# Patient Record
Sex: MALE | Race: BLACK OR AFRICAN AMERICAN
[De-identification: names, ages, dates, MRNs, and addresses within clinical notes are randomized per-mention and may not be internally consistent; named-entity substitution may affect disease eponyms.]

---

## 2017-10-03 ENCOUNTER — HOSPITAL ENCOUNTER (OUTPATIENT)
Dept: HOSPITAL 72 - RAD | Age: 57
Discharge: HOME | End: 2017-10-03
Payer: MEDICARE

## 2017-10-03 DIAGNOSIS — M16.0: Primary | ICD-10-CM

## 2017-10-03 DIAGNOSIS — M85.88: ICD-10-CM

## 2017-10-03 DIAGNOSIS — Z96.641: ICD-10-CM

## 2017-10-03 PROCEDURE — 73502 X-RAY EXAM HIP UNI 2-3 VIEWS: CPT

## 2017-10-04 NOTE — DIAGNOSTIC IMAGING REPORT
Indications: hip pain



Findings: Two  views of the right hip were obtained.



There is a revised right total hip replacement demonstrated. There is no 

fracture

identified. Bones are osteopenic. There is no dislocation identified.



Impression:



Revised right total hip replacement. No acute fracture appreciated

## 2017-10-04 NOTE — DIAGNOSTIC IMAGING REPORT
Indications: hip pain



Findings: Two  views of the left hip were obtained.



No acute fracture or malalignment definitely seen. Lower lumbar laminectomy and

fusion apparatus noted. Bones are osteopenic.



Impression:



No acute injury identified

## 2018-04-03 ENCOUNTER — HOSPITAL ENCOUNTER (INPATIENT)
Dept: HOSPITAL 72 - EMR | Age: 58
LOS: 6 days | Discharge: HOME | DRG: 552 | End: 2018-04-09
Payer: MEDICARE

## 2018-04-03 VITALS — SYSTOLIC BLOOD PRESSURE: 99 MMHG | DIASTOLIC BLOOD PRESSURE: 57 MMHG

## 2018-04-03 VITALS — HEIGHT: 74 IN | WEIGHT: 180 LBS | BODY MASS INDEX: 23.1 KG/M2

## 2018-04-03 VITALS — DIASTOLIC BLOOD PRESSURE: 64 MMHG | SYSTOLIC BLOOD PRESSURE: 113 MMHG

## 2018-04-03 VITALS — DIASTOLIC BLOOD PRESSURE: 78 MMHG | SYSTOLIC BLOOD PRESSURE: 150 MMHG

## 2018-04-03 VITALS — SYSTOLIC BLOOD PRESSURE: 126 MMHG | DIASTOLIC BLOOD PRESSURE: 72 MMHG

## 2018-04-03 DIAGNOSIS — Z86.14: ICD-10-CM

## 2018-04-03 DIAGNOSIS — M48.061: ICD-10-CM

## 2018-04-03 DIAGNOSIS — I10: ICD-10-CM

## 2018-04-03 DIAGNOSIS — M46.46: ICD-10-CM

## 2018-04-03 DIAGNOSIS — N40.0: ICD-10-CM

## 2018-04-03 DIAGNOSIS — R33.9: ICD-10-CM

## 2018-04-03 DIAGNOSIS — Z98.1: ICD-10-CM

## 2018-04-03 DIAGNOSIS — E87.5: ICD-10-CM

## 2018-04-03 DIAGNOSIS — B19.20: ICD-10-CM

## 2018-04-03 DIAGNOSIS — M25.50: ICD-10-CM

## 2018-04-03 DIAGNOSIS — M50.10: Primary | ICD-10-CM

## 2018-04-03 LAB
ADD MANUAL DIFF: YES
ALBUMIN SERPL-MCNC: 3.5 G/DL (ref 3.4–5)
ALBUMIN/GLOB SERPL: 0.9 {RATIO} (ref 1–2.7)
ALP SERPL-CCNC: 127 U/L (ref 46–116)
ALT SERPL-CCNC: 21 U/L (ref 12–78)
ANION GAP SERPL CALC-SCNC: 8 MMOL/L (ref 5–15)
AST SERPL-CCNC: 55 U/L (ref 15–37)
BILIRUB SERPL-MCNC: 0.4 MG/DL (ref 0.2–1)
BUN SERPL-MCNC: 14 MG/DL (ref 7–18)
CALCIUM SERPL-MCNC: 8.5 MG/DL (ref 8.5–10.1)
CHLORIDE SERPL-SCNC: 105 MMOL/L (ref 98–107)
CO2 SERPL-SCNC: 26 MMOL/L (ref 21–32)
CREAT SERPL-MCNC: 0.5 MG/DL (ref 0.55–1.3)
ERYTHROCYTE [DISTWIDTH] IN BLOOD BY AUTOMATED COUNT: 16 % (ref 11.6–14.8)
GLOBULIN SER-MCNC: 3.9 G/DL
HCT VFR BLD CALC: 38.8 % (ref 42–52)
HGB BLD-MCNC: 12.5 G/DL (ref 14.2–18)
MCV RBC AUTO: 94 FL (ref 80–99)
PLATELET # BLD: 221 K/UL (ref 150–450)
POTASSIUM SERPL-SCNC: 6.3 MMOL/L (ref 3.5–5.1)
RBC # BLD AUTO: 4.12 M/UL (ref 4.7–6.1)
SODIUM SERPL-SCNC: 138 MMOL/L (ref 136–145)
WBC # BLD AUTO: 3.3 K/UL (ref 4.8–10.8)

## 2018-04-03 PROCEDURE — 72125 CT NECK SPINE W/O DYE: CPT

## 2018-04-03 PROCEDURE — 36415 COLL VENOUS BLD VENIPUNCTURE: CPT

## 2018-04-03 PROCEDURE — 80053 COMPREHEN METABOLIC PANEL: CPT

## 2018-04-03 PROCEDURE — 81001 URINALYSIS AUTO W/SCOPE: CPT

## 2018-04-03 PROCEDURE — 84153 ASSAY OF PSA TOTAL: CPT

## 2018-04-03 PROCEDURE — 84132 ASSAY OF SERUM POTASSIUM: CPT

## 2018-04-03 PROCEDURE — 85007 BL SMEAR W/DIFF WBC COUNT: CPT

## 2018-04-03 PROCEDURE — 72131 CT LUMBAR SPINE W/O DYE: CPT

## 2018-04-03 PROCEDURE — 82306 VITAMIN D 25 HYDROXY: CPT

## 2018-04-03 PROCEDURE — 99285 EMERGENCY DEPT VISIT HI MDM: CPT

## 2018-04-03 PROCEDURE — 85025 COMPLETE CBC W/AUTO DIFF WBC: CPT

## 2018-04-03 RX ADMIN — PREGABALIN SCH MG: 50 CAPSULE ORAL at 17:31

## 2018-04-03 RX ADMIN — HEPARIN SODIUM SCH UNITS: 5000 INJECTION INTRAVENOUS; SUBCUTANEOUS at 20:49

## 2018-04-03 RX ADMIN — TAMSULOSIN HYDROCHLORIDE SCH MG: 0.4 CAPSULE ORAL at 20:48

## 2018-04-03 NOTE — DIAGNOSTIC IMAGING REPORT
Indication: Neck pain radiating down arm and back pain

 

Technique: Spiral acquisitions obtained through the cervical spine. No IV contrast

utilized. Multiplanar reconstructions were generated.  Total dose length product

564.28 mGycm. CTDIvol(s) 27.41 mGy. Dose reduction achieved using automated exposure

control.

 

 

Comparison: none

 

Findings: There is minimal posterior offset of C3 on C4, C4-C5, and of C5 on C6.

Otherwise normal bony alignment. The vertebral body heights are preserved. There is

upper thoracic spine fusion hardware incompletely included.

 

At C2-3, there is mild degenerative disc narrowing. There is broad-based posterior

disc protrusion and ligamentum flavum hypertrophy which results in mild narrowing of

the spinal canal to 9 mm minimum AP diameter. The neural foramina are preserved.

 

At C3-4, there is severe degenerative disc narrowing a large posterior osteophyte

impinges on the left lateral recess. There is severe left and moderate to severe

right neural foraminal stenosis.

 

At C4-5, there is there is moderate to severe degenerative disc narrowing. Posterior

osteophytes result in mild narrowing of the spinal canal. There is severe narrowing

of the bilateral neural foramina. Subchondral cyst with vacuum are seen in the

adjacent vertebral bodies

 

At C5-6, posterior osteophytes result in mild-to-moderate narrowing of the spinal

canal. There is severe bilateral neural foraminal narrowing. There is moderate to

severe degenerative disc narrowing. Subchondral cysts with vacuum are seen in the C5

vertebral body

 

At C6-7, there is moderate degenerative disc narrowing. The neural foramina are

minimally narrowed.

 

At C7-T1, there is moderate to severe degenerative disc narrowing. There is mild

narrowing the right neural foramen.

 

The included extra spinal soft tissues are unremarkable

 

Impression: No acute bony trauma

 

Degenerative changes, as detailed level of the orbits as above

 

 

 

The CT scanner at [facility] is accredited by the American College of

Radiology and the scans are performed using protocols designed to limit radiation

exposure to as low as reasonably achievable to attain images of sufficient resolution

adequate for diagnostic evaluation.

## 2018-04-03 NOTE — HISTORY & PHYSICAL
History and Physical


History & Physicial





thi sis an unfortunate male with historyof degernmative disc disease


 initrally had spine surgery in [place] 





has had hip painand back pain


noted to have diskitis o fthe lyumbar spine


 had spien surgery for that had fusion and had recurent spine MRSA infection


He then had septic hip and had few hip surgery and revision and required 

prolonged antibiotic therapy


he has been seen and eval by Dr salgado ID


he has been having a l;ot of pain


 recetnly his painDr discharged him fromhis clinic





PMH


leukopenia


 thrombocytopenioa


hepatiti sc


failed back syndrome


PSH:


numerous spine and hip surgery


historyof tracheostomy





MEDICATION


lyrica


xanax


 flomax doxycyline


was on alot of painmeds before


vitals reviewed


 no jvd


 cta


s1,as12,rrr


soft 


no clubbing





lumbar and thoracic failed spine disease


chronic pain


 ? tolerance to medicaiotn nad dependency





plan get ct lumbar and thoracic and get  eval transfer to rehab 

soon











HUONG MERCER Apr 3, 2018 21:56

## 2018-04-03 NOTE — EMERGENCY ROOM REPORT
History of Present Illness


General


Chief Complaint:  Back Pain-No Injury


Source:  Patient





Present Illness


HPI


57-year-old male presents ED for evaluation.  Patient states he is referred by 

his PMD.  Complaining of severe neck pain and back pain.  Denies any recent 

trauma.  States pain is tingling down his right arm.  States he had surgery on 

his back a few months ago.  States the pain medications are not helping.  Pain 

is 10 out of 10, sharp, radiating down both legs and down the right arm.  

Denies chest pain or shortness of breath.  No other aggravating relieving 

factors.  Denies any other associated symptoms


Allergies:  


Coded Allergies:  


     No Known Allergies (Verified  Allergy, Unknown, 11/29/06)





Patient History


Past Medical History:  HTN


Past Surgical History:  other - back surgery


Pertinent Family History:  none


Social History:  Denies: smoking, alcohol use, drug use


Immunizations:  UTD


Reviewed Nursing Documentation:  PMH: Agreed; PSxH: Agreed





Nursing Documentation-PMH


Hx Hypertension:  Yes





Review of Systems


All Other Systems:  negative except mentioned in HPI





Physical Exam





Vital Signs








  Date Time  Temp Pulse Resp B/P (MAP) Pulse Ox O2 Delivery O2 Flow Rate FiO2


 


4/3/18 11:18 98.1 80 16 126/72 98   





 98.1       








Sp02 EP Interpretation:  reviewed, normal


General Appearance:  no apparent distress, alert, GCS 15, non-toxic


Head:  normocephalic, atraumatic


Eyes:  bilateral eye normal inspection, bilateral eye PERRL


ENT:  hearing grossly normal, normal pharynx, no angioedema, normal voice


Neck:  full range of motion, supple/symm/no masses, tender midline


Respiratory:  chest non-tender, lungs clear, normal breath sounds, speaking 

full sentences


Cardiovascular #1:  regular rate, rhythm, no edema


Cardiovascular #2:  2+ carotid (R), 2+ carotid (L), 2+ radial (R), 2+ radial (L)

, 2+ dorsalis pedis (R), 2+ dorsalis pedis (L)


Gastrointestinal:  normal bowel sounds, non tender, soft, non-distended, no 

guarding, no rebound


Rectal:  deferred


Genitourinary:  normal inspection, no CVA tenderness, vertebral tenderness


Musculoskeletal:  back normal, gait/station normal, normal range of motion, non-

tender


Neurologic:  alert, oriented x3, responsive, motor strength/tone normal, 

sensory intact, speech normal


Psychiatric:  judgement/insight normal, memory normal, mood/affect normal, no 

suicidal/homicidal ideation


Reflexes:  3+ bicep (R), 3+ bicep (L), 3+ tricep (R), 3+ tricep (L), 3+ knee (R)

, 3+ knee (L)


Skin:  normal color, no rash, warm/dry, well hydrated


Lymphatic:  no adenopathy





Medical Decision Making


Diagnostic Impression:  


 Primary Impression:  


 Intractable back pain


 Additional Impression:  


 Cervical radiculopathy


ER Course


Hospital Course 


56 yo M presents to ED c/o pain in neck and back.  radiating down R arm





Differential diagnoses include: fx, dislocation, DJD





Clinical course


Patient placed on stretcher.  Cardiac monitor.  After initial history and 

physical I ordered labs, pain meds, CT Cspine





Labs - no leukocytosis, Hb/Hct stable.  K > 6 but hemolyzed


repeat labs pending


CT C spine extensive DJD





Patient having cervical radiculopathy explaining that radiating pain down the 

right arm.





Spoke to PMD.  Patient has had difficulty with pain control despite adequate 

analgesia patient continues to have pain.  Will require admission





Case discussed with  and he agreed to accept the patient to his service 

for further care and support 





I feel this is a highly complex case requiring extensive working including EKG/

Rhythm strip, Xray/CT/US, Blood/urine lab work, repeat exams while in ED, and 

administration of strong opiates/narcotics for pain control, admission to 

hospital or close patient follow up.  





Diagnosis -intractable back pain, cervical radiculopathy 





Patient admitted to floor in serious condition





Labs








Test


  4/3/18


12:55


 


White Blood Count


  3.3 K/UL


(4.8-10.8)


 


Red Blood Count


  4.12 M/UL


(4.70-6.10)


 


Hemoglobin


  12.5 G/DL


(14.2-18.0)


 


Hematocrit


  38.8 %


(42.0-52.0)


 


Mean Corpuscular Volume 94 FL (80-99) 


 


Mean Corpuscular Hemoglobin


  30.4 PG


(27.0-31.0)


 


Mean Corpuscular Hemoglobin


Concent 32.3 G/DL


(32.0-36.0)


 


Red Cell Distribution Width


  16.0 %


(11.6-14.8)


 


Platelet Count


  221 K/UL


(150-450)


 


Mean Platelet Volume


  7.3 FL


(6.5-10.1)


 


Neutrophils (%) (Auto)  % (45.0-75.0) 


 


Lymphocytes (%) (Auto)  % (20.0-45.0) 


 


Monocytes (%) (Auto)  % (1.0-10.0) 


 


Eosinophils (%) (Auto)  % (0.0-3.0) 


 


Basophils (%) (Auto)  % (0.0-2.0) 


 


Differential Total Cells


Counted 100 


 


 


Neutrophils % (Manual) 55 % (45-75) 


 


Lymphocytes % (Manual) 32 % (20-45) 


 


Monocytes % (Manual) 9 % (1-10) 


 


Eosinophils % (Manual) 4 % (0-3) 


 


Basophils % (Manual) 0 % (0-2) 


 


Band Neutrophils 0 % (0-8) 


 


Platelet Estimate Adequate 


 


Platelet Morphology Normal 


 


Hypochromasia 1+ 


 


Anisocytosis 1+ 


 


Sodium Level


  138 MMOL/L


(136-145)


 


Potassium Level


  6.3 MMOL/L


(3.5-5.1)


 


Chloride Level


  105 MMOL/L


()


 


Carbon Dioxide Level


  26 MMOL/L


(21-32)


 


Anion Gap


  8 mmol/L


(5-15)


 


Blood Urea Nitrogen


  14 mg/dL


(7-18)


 


Creatinine


  0.5 MG/DL


(0.55-1.30)


 


Estimat Glomerular Filtration


Rate > 60 mL/min


(>60)


 


Glucose Level


  100 MG/DL


()


 


Calcium Level


  8.5 MG/DL


(8.5-10.1)


 


Total Bilirubin


  0.4 MG/DL


(0.2-1.0)


 


Aspartate Amino Transf


(AST/SGOT) 55 U/L (15-37) 


 


 


Alanine Aminotransferase


(ALT/SGPT) 21 U/L (12-78) 


 


 


Alkaline Phosphatase


  127 U/L


()


 


Total Protein


  7.4 G/DL


(6.4-8.2)


 


Albumin


  3.5 G/DL


(3.4-5.0)


 


Globulin 3.9 g/dL 


 


Albumin/Globulin Ratio 0.9 (1.0-2.7) 








CT/MRI/US Diagnostic Results


CT/MRI/US Diagnostic Results :  


   Imaging Test Ordered:  CT C spine


   Impression


multilevel DJD





Last Vital Signs








  Date Time  Temp Pulse Resp B/P (MAP) Pulse Ox O2 Delivery O2 Flow Rate FiO2


 


4/3/18 11:35 98.1 80 16 126/72 98   





 98.1       








Status:  improved


Disposition:  ADMITTED AS INPATIENT


Condition:  Serious


Referrals:  


NON PHYSICIAN (PCP)











Arnold Sorenson MD Apr 3, 2018 14:22

## 2018-04-04 VITALS — SYSTOLIC BLOOD PRESSURE: 116 MMHG | DIASTOLIC BLOOD PRESSURE: 66 MMHG

## 2018-04-04 VITALS — DIASTOLIC BLOOD PRESSURE: 75 MMHG | SYSTOLIC BLOOD PRESSURE: 135 MMHG

## 2018-04-04 VITALS — DIASTOLIC BLOOD PRESSURE: 49 MMHG | SYSTOLIC BLOOD PRESSURE: 110 MMHG

## 2018-04-04 VITALS — DIASTOLIC BLOOD PRESSURE: 54 MMHG | SYSTOLIC BLOOD PRESSURE: 114 MMHG

## 2018-04-04 VITALS — DIASTOLIC BLOOD PRESSURE: 62 MMHG | SYSTOLIC BLOOD PRESSURE: 142 MMHG

## 2018-04-04 VITALS — SYSTOLIC BLOOD PRESSURE: 129 MMHG | DIASTOLIC BLOOD PRESSURE: 68 MMHG

## 2018-04-04 LAB
ADD MANUAL DIFF: NO
ALBUMIN SERPL-MCNC: 3.1 G/DL (ref 3.4–5)
ALBUMIN/GLOB SERPL: 1 {RATIO} (ref 1–2.7)
ALP SERPL-CCNC: 120 U/L (ref 46–116)
ALT SERPL-CCNC: 17 U/L (ref 12–78)
ANION GAP SERPL CALC-SCNC: 6 MMOL/L (ref 5–15)
AST SERPL-CCNC: 31 U/L (ref 15–37)
BASOPHILS NFR BLD AUTO: 1.3 % (ref 0–2)
BILIRUB SERPL-MCNC: 0.4 MG/DL (ref 0.2–1)
BUN SERPL-MCNC: 21 MG/DL (ref 7–18)
CALCIUM SERPL-MCNC: 8.3 MG/DL (ref 8.5–10.1)
CHLORIDE SERPL-SCNC: 103 MMOL/L (ref 98–107)
CO2 SERPL-SCNC: 28 MMOL/L (ref 21–32)
CREAT SERPL-MCNC: 0.7 MG/DL (ref 0.55–1.3)
EOSINOPHIL NFR BLD AUTO: 7.1 % (ref 0–3)
ERYTHROCYTE [DISTWIDTH] IN BLOOD BY AUTOMATED COUNT: 15.6 % (ref 11.6–14.8)
GLOBULIN SER-MCNC: 3.2 G/DL
HCT VFR BLD CALC: 36.8 % (ref 42–52)
HGB BLD-MCNC: 11.6 G/DL (ref 14.2–18)
LYMPHOCYTES NFR BLD AUTO: 42.2 % (ref 20–45)
MCV RBC AUTO: 95 FL (ref 80–99)
MONOCYTES NFR BLD AUTO: 11.3 % (ref 1–10)
NEUTROPHILS NFR BLD AUTO: 38 % (ref 45–75)
PLATELET # BLD: 187 K/UL (ref 150–450)
POTASSIUM SERPL-SCNC: 3.7 MMOL/L (ref 3.5–5.1)
RBC # BLD AUTO: 3.89 M/UL (ref 4.7–6.1)
SODIUM SERPL-SCNC: 137 MMOL/L (ref 136–145)
WBC # BLD AUTO: 3.6 K/UL (ref 4.8–10.8)

## 2018-04-04 RX ADMIN — HEPARIN SODIUM SCH UNITS: 5000 INJECTION INTRAVENOUS; SUBCUTANEOUS at 08:20

## 2018-04-04 RX ADMIN — PREGABALIN SCH MG: 50 CAPSULE ORAL at 13:20

## 2018-04-04 RX ADMIN — ZOLPIDEM TARTRATE PRN MG: 5 TABLET ORAL at 23:17

## 2018-04-04 RX ADMIN — PREGABALIN SCH MG: 50 CAPSULE ORAL at 08:16

## 2018-04-04 RX ADMIN — ALPRAZOLAM SCH MG: 0.5 TABLET ORAL at 08:16

## 2018-04-04 RX ADMIN — TAMSULOSIN HYDROCHLORIDE SCH MG: 0.4 CAPSULE ORAL at 20:27

## 2018-04-04 RX ADMIN — ALPRAZOLAM SCH MG: 0.5 TABLET ORAL at 17:36

## 2018-04-04 RX ADMIN — PREGABALIN SCH MG: 50 CAPSULE ORAL at 17:36

## 2018-04-04 RX ADMIN — HEPARIN SODIUM SCH UNITS: 5000 INJECTION INTRAVENOUS; SUBCUTANEOUS at 20:29

## 2018-04-04 NOTE — DIAGNOSTIC IMAGING REPORT
Indication: Back pain

 

Technique: Continuous helical transaxial imaging of the lumbar spine was obtained

from the lung bases to the pubic symphysis.  No IV contrast was administered. 

Coronal 2-D reformats were also obtained. Study obtained in a Siemens sensation 64

slice CT. 

 

Total Dose length Product (DLP):  629.25 mGycm

 

CT Dose Index Volume (CTDIvol):   17.51 mGy

 

 

Comparison: None

 

Findings: The field of view encompasses T11 through majority of the sacrum.

Multilevel posterior fusion has been performed with pedicle screws and fusion rods

from T11 through S1. Above T11 there may be continuation of hardware but this is

beyond the field of view. The fusion apparatus is overlapping. At the lower thoracic

spine, there are bilateral pedicle screws and fusion rods at both T11 and T12. The

rods are interconnected with a set of posterior lumbar fusion rods. The lumbar fusion

rods are associated with bilateral pedicle screws at L1, left unilateral pedicle

screw at L2, bilateral pedicle screws at L3, L4, L5 and S1. On the left side there is

a screw that traverses the left ilium adjacent to and partly traversing the

sacroiliac joint. There is diffuse osseous union of the facets throughout the

visualized portions of the lower thoracic and lumbar spine. Multilevel interbody

fusion also noted throughout the lumbar spine. Buttressing anterior vertebral screw

also noted at the anterior inferior aspect of the L4 vertebra. Prosthetic disks at

L1-2, L2-3, L3-4, L4-5 demonstrated. Partial fusion of the right sacroiliac joint

noted. There is streak artifact from metallic hardware limiting evaluation. But there

is no obvious fracture. Laminectomies also noted at some of the levels including left

side at L1-2, bilateral at L4-5 and L5-S1. Moderate to PATIENT is noted within the

aorta. Moderate amount of fecal material distending the rectosigmoid colon. Right

total hip prosthesis noted. Evaluation of the central spinal canal is difficult

because of the artifact present. At L4-5 there is a large osseous fragment that

projects into the canal. For further evaluation of this MR may be of benefit.

 

IMPRESSION:

 

Multilevel lower thoracic and lumbar fusion as described above. Diffuse anterior

interbody and posterior facet fusion noted. Multilevel laminectomy is also present.

 

Prominent bone spur projecting into the thecal sac at L4-5. Further evaluation of

this with MR may be of benefit as warranted clinically.

 

Atherosclerotic disease

 

Fecal impaction

 

 

 

The CT scanner at [facility] is accredited by the American College of

Radiology and the scans are performed using dose optimization techniques as

appropriate to a performed exam including Automatic Exposure control.

## 2018-04-04 NOTE — CONSULTATION
Consult Note


Assessment/Plan


Renal consult dictated # [id number]











JESSICA SMITH Apr 4, 2018 14:52

## 2018-04-04 NOTE — GENERAL PROGRESS NOTE
Assessment/Plan


Status Narrative


hyperkalemia


? lab error


failed back synderorme


 cervical adisc disease


 groin numbness likleywill be permenant


to rehab [facility] or [place] [facility].





Subjective


Date patient seen:  Apr 4, 2018


Time patient seen:  20:44


Constitutional:  Reports: no symptoms


HEENT:  Reports: no symptoms


Allergies:  


Coded Allergies:  


     No Known Allergies (Verified  Allergy, Unknown, 11/29/06)


Subjective


compliandso f pain


no fevernochills


 no ramya stpain 


ha sgroin numbnes sand tingling





Objective





Last 24 Hour Vital Signs








  Date Time  Temp Pulse Resp B/P (MAP) Pulse Ox O2 Delivery O2 Flow Rate FiO2


 


4/4/18 19:54      Room Air  


 


4/4/18 19:48 97.3 66 20 135/75 100 Room Air  





 97.3       


 


4/4/18 18:28 97.9       


 


4/4/18 17:58 97.9       


 


4/4/18 17:20      Room Air  


 


4/4/18 16:00 97.9 62 18 129/68 99   





 97.9       


 


4/4/18 13:37 97.5       


 


4/4/18 13:01      Room Air  


 


4/4/18 12:00 97.5 63 20 142/62 100   





 97.5       


 


4/4/18 09:27 97.3       


 


4/4/18 08:00 97.3 70 19 114/54 99 Room Air  





 97.3       


 


4/4/18 08:00 97.3 70 19 114/54 99   





 97.3       


 


4/4/18 05:06 97.8       


 


4/4/18 04:19      Room Air  


 


4/4/18 04:00 97.8 73 18 116/66 97   





 97.8       


 


4/4/18 01:02 97.6       


 


4/4/18 01:00  74  110/49    


 


4/4/18 00:12      Room Air  


 


4/3/18 23:57 97.6 70 18 99/57 97   





 97.6       


 


4/3/18 20:49 98.3       

















Intake and Output  


 


 4/3/18 4/4/18





 19:00 07:00


 


Intake Total 375 ml 480 ml


 


Balance 375 ml 480 ml


 


  


 


Intake Oral 375 ml 480 ml


 


# Voids 2 2


 


# Bowel Movements  1








Laboratory Tests


4/4/18 05:10: 


White Blood Count 3.6L, Red Blood Count 3.89L, Hemoglobin 11.6L, Hematocrit 

36.8L, Mean Corpuscular Volume 95, Mean Corpuscular Hemoglobin 29.8, Mean 

Corpuscular Hemoglobin Concent 31.4L, Red Cell Distribution Width 15.6H, 

Platelet Count 187, Mean Platelet Volume 7.2, Neutrophils (%) (Auto) 38.0L, 

Lymphocytes (%) (Auto) 42.2, Monocytes (%) (Auto) 11.3H, Eosinophils (%) (Auto) 

7.1H, Basophils (%) (Auto) 1.3, Sodium Level 137, Potassium Level 3.7, Chloride 

Level 103, Carbon Dioxide Level 28, Anion Gap 6, Blood Urea Nitrogen 21H, 

Creatinine 0.7, Estimat Glomerular Filtration Rate > 60, Glucose Level 100, 

Calcium Level 8.3L, Total Bilirubin 0.4, Aspartate Amino Transf (AST/SGOT) 31, 

Alanine Aminotransferase (ALT/SGPT) 17, Alkaline Phosphatase 120H, Total 

Protein 6.3L, Albumin 3.1L, Globulin 3.2, Albumin/Globulin Ratio 1.0


Height (Feet):  6


Height (Inches):  2.00


Weight (Pounds):  180


General Appearance:  WD/WN


Cardiovascular:  normal rate, regular rhythm, no JVD


Respiratory/Chest:  chest wall non-tender


Abdomen:  soft











HUONG MERCER Apr 4, 2018 20:46

## 2018-04-04 NOTE — CONSULTATION
DATE OF CONSULTATION:  04/04/2018



NEPHROLOGY CONSULTATION



CONSULTING PHYSICIAN:  Carlos Zaidi M.D.



REFERRING PHYSICIAN:  Delano Hadley M.D.



REASON FOR CONSULTATION:  Hyperkalemia, BPH, and mild hypertension.



HISTORY OF PRESENT ILLNESS:  The patient is a 57-year-old 

male who has had back pain.  The patient has had problems with diskitis

surgery with fusion and recurrent spine MRSA infections.  The patient was

admitted and was found to have a potassium 6.3 yesterday however the

potassium is down to 3.7 today, this in the presence of a normal kidney

function.  The patient has also some elevation of blood pressure 142/62

and also he has history of enlarged prostate and I was asked to see him in

Nephrology consultation.



PAST MEDICAL HISTORY:  Also includes history of hepatitis C, failed back

syndrome, numerous supine and hip surgeries, history of tracheostomy,

leukopenia, and thrombocytopenia.



MEDICATIONS:  Reviewed in the EMR.



SOCIAL HISTORY:  The patient has been drinking beer recently because of his

pain.  He lives in the unit with his aunt.



ALLERGIES:  No known drug allergies.



REVIEW OF SYSTEMS:  Noncontributory.



PHYSICAL EXAMINATION:

GENERAL:  The patient is a 57-year-old male, in no acute distress.

VITAL SIGNS:  Blood pressure 142/62, pulse 63, temperature 97.5,

respiratory is 20.

HEENT:  Pink conjunctivae.  Anicteric sclerae.

NECK:  Supple.

LUNGS:  Clear to auscultation.

HEART:  S1 and S2 without murmurs or rubs.

ABDOMEN:  Soft, nontender.

EXTREMITIES:  No cyanosis or edema.



LABORATORY FINDINGS:  CBC shows WBC of 3600, hematocrit 36.8, hemoglobin is

11.6, platelets 187,000.  Chemistry panel shows a serum sodium of 137,

potassium 3.7, chloride 103, CO2 of 28, BUN is 21, and creatinine 0.7,

calcium is 8.3.



ASSESSMENT:  This is a 57-year-old male who was admitted with back pain and

shoulder pain.  I was asked to see him because of his hyperkalemia.  It

appears that his hyperkalemia had been a laboratory error since the repeat

is 3.7, also patient was not getting any potassium at home, also his

kidney function is normal.  The other issue he has is his BPH and he is

taking Flomax.  He states that he is going only once at night to urinate.

It appears that his symptoms are under control.  His elevation of blood

pressure may be related to his pain at this point.



PLAN:

1. PSA level will be checked to make sure the patient does not have any

sign of any malignancy with high PSA.

2. I would continue Flomax as is.

3. A vitamin D level will be checked as his serum calcium is somewhat on

the low side.

4. I would hold off any blood pressure medications and observe him for

now.



Thank you very much, Dr. Hadley, for this consultation.









  ______________________________________________

  Carlos Zaidi M.D.





DR:  Poncho

D:  04/04/2018 14:47

T:  04/04/2018 17:54

JOB#:  [id number]

CC:

## 2018-04-05 VITALS — DIASTOLIC BLOOD PRESSURE: 59 MMHG | SYSTOLIC BLOOD PRESSURE: 120 MMHG

## 2018-04-05 VITALS — SYSTOLIC BLOOD PRESSURE: 130 MMHG | DIASTOLIC BLOOD PRESSURE: 69 MMHG

## 2018-04-05 VITALS — SYSTOLIC BLOOD PRESSURE: 105 MMHG | DIASTOLIC BLOOD PRESSURE: 62 MMHG

## 2018-04-05 VITALS — SYSTOLIC BLOOD PRESSURE: 116 MMHG | DIASTOLIC BLOOD PRESSURE: 77 MMHG

## 2018-04-05 VITALS — SYSTOLIC BLOOD PRESSURE: 124 MMHG | DIASTOLIC BLOOD PRESSURE: 70 MMHG

## 2018-04-05 VITALS — SYSTOLIC BLOOD PRESSURE: 134 MMHG | DIASTOLIC BLOOD PRESSURE: 76 MMHG

## 2018-04-05 LAB
APPEARANCE UR: CLEAR
APTT PPP: (no result) S
GLUCOSE UR STRIP-MCNC: NEGATIVE MG/DL
KETONES UR QL STRIP: NEGATIVE
LEUKOCYTE ESTERASE UR QL STRIP: NEGATIVE
NITRITE UR QL STRIP: NEGATIVE
PH UR STRIP: 7 [PH] (ref 4.5–8)
PROT UR QL STRIP: NEGATIVE
SP GR UR STRIP: 1.01 (ref 1–1.03)
UROBILINOGEN UR-MCNC: NORMAL MG/DL (ref 0–1)

## 2018-04-05 RX ADMIN — ALPRAZOLAM SCH MG: 0.5 TABLET ORAL at 17:47

## 2018-04-05 RX ADMIN — PREGABALIN SCH MG: 50 CAPSULE ORAL at 17:47

## 2018-04-05 RX ADMIN — HEPARIN SODIUM SCH UNITS: 5000 INJECTION INTRAVENOUS; SUBCUTANEOUS at 20:12

## 2018-04-05 RX ADMIN — PREGABALIN SCH MG: 50 CAPSULE ORAL at 08:51

## 2018-04-05 RX ADMIN — ALPRAZOLAM SCH MG: 0.5 TABLET ORAL at 08:51

## 2018-04-05 RX ADMIN — TAMSULOSIN HYDROCHLORIDE SCH MG: 0.4 CAPSULE ORAL at 20:08

## 2018-04-05 RX ADMIN — PREGABALIN SCH MG: 50 CAPSULE ORAL at 13:55

## 2018-04-05 RX ADMIN — HEPARIN SODIUM SCH UNITS: 5000 INJECTION INTRAVENOUS; SUBCUTANEOUS at 08:55

## 2018-04-05 NOTE — NEPHROLOGY PROGRESS NOTE
Assessment/Plan


Problem List:  


(1) Cervical radiculopathy


(2) Intractable back pain


(3) Urinary retention


Assessment


PSA low at 0.18


Plan


cont flow max


pain meds


watch BP





Subjective


Subjective


C/O pains and aches





Objective


Objective





Last 24 Hour Vital Signs








  Date Time  Temp Pulse Resp B/P (MAP) Pulse Ox O2 Delivery O2 Flow Rate FiO2


 


4/5/18 08:00 97.6 54 19 134/76 100   





 97.6       


 


4/5/18 04:00 97.3 58 18 116/77 99   





 97.3       


 


4/5/18 02:51 97.5       


 


4/5/18 02:21 97.5       


 


4/5/18 00:28      Room Air  


 


4/5/18 00:00 97.5 66 18 105/62 96   





 97.5       


 


4/4/18 22:06 97.3       


 


4/4/18 19:54      Room Air  


 


4/4/18 19:48 97.3 66 20 135/75 100 Room Air  





 97.3       


 


4/4/18 17:58 97.9       


 


4/4/18 17:20      Room Air  


 


4/4/18 16:00 97.9 62 18 129/68 99   





 97.9       


 


4/4/18 13:37 97.5       


 


4/4/18 13:01      Room Air  

















Intake and Output  


 


 4/4/18 4/5/18





 19:00 07:00


 


Intake Total 2500 ml 900 ml


 


Output Total 3200 ml 2400 ml


 


Balance -700 ml -1500 ml


 


  


 


Intake Oral 2500 ml 900 ml


 


Output Urine Total 3200 ml 2400 ml








Laboratory Tests


4/4/18 23:32: 


Urine Color Pale yellow, Urine Appearance Clear, Urine pH 7, Urine Specific 

Gravity 1.010, Urine Protein Negative, Urine Glucose (UA) Negative, Urine 

Ketones Negative, Urine Occult Blood Negative, Urine Nitrite Negative, Urine 

Bilirubin Negative, Urine Urobilinogen Normal, Urine Leukocyte Esterase Negative

, Urine RBC 0, Urine WBC 0, Urine Squamous Epithelial Cells None, Urine 

Bacteria None


4/5/18 06:37: 


Prostate Specific Antigen 0.18, Vitamin D 25-Hydroxy [Pending], 25-Hydroxy 

Vitamin D2 [Pending], 25-Hydroxy Vitamin D3 [Pending]


Height (Feet):  6


Height (Inches):  2.00


Weight (Pounds):  180


Cardiovascular:  normal rate


Respiratory/Chest:  lungs clear


Extremities:  other - no edema











JESSICA SMITH Apr 5, 2018 12:11

## 2018-04-05 NOTE — GENERAL PROGRESS NOTE
Assessment/Plan


Status Narrative


failed back syndroem


 failed thoraci syndrome


 cervical radiculitis


 pineda arhtropahty 


history of tracheostomy


history of hep c


leukopenia history


plan 


trnsfer to [facility] e in am and have him have shoulder surgery then to go to rehab.





Subjective


Date patient seen:  Apr 5, 2018


Time patient seen:  19:49


Constitutional:  Reports: no symptoms


HEENT:  Reports: no symptoms


Cardiovascular:  Reports: no symptoms


Allergies:  


Coded Allergies:  


     No Known Allergies (Verified  Allergy, Unknown, 11/29/06)


Subjective


compliandso f pain


no fevernochills


 no ramya stpain 


ha sgroin numbnes sand tingling





Objective





Last 24 Hour Vital Signs








  Date Time  Temp Pulse Resp B/P (MAP) Pulse Ox O2 Delivery O2 Flow Rate FiO2


 


4/5/18 15:49 96.9 60 20 124/70 100   





 96.9       


 


4/5/18 12:00 96.5 66 20 120/59 100   





 96.5       


 


4/5/18 08:00 97.6 54 19 134/76 100   





 97.6       


 


4/5/18 04:00 97.3 58 18 116/77 99   





 97.3       


 


4/5/18 02:51 97.5       


 


4/5/18 02:21 97.5       


 


4/5/18 00:28      Room Air  


 


4/5/18 00:00 97.5 66 18 105/62 96   





 97.5       


 


4/4/18 22:06 97.3       


 


4/4/18 19:54      Room Air  

















Intake and Output  


 


 4/4/18 4/5/18





 19:00 07:00


 


Intake Total 2500 ml 900 ml


 


Output Total 3200 ml 2400 ml


 


Balance -700 ml -1500 ml


 


  


 


Intake Oral 2500 ml 900 ml


 


Output Urine Total 3200 ml 2400 ml








Laboratory Tests


4/4/18 23:32: 


Urine Color Pale yellow, Urine Appearance Clear, Urine pH 7, Urine Specific 

Gravity 1.010, Urine Protein Negative, Urine Glucose (UA) Negative, Urine 

Ketones Negative, Urine Occult Blood Negative, Urine Nitrite Negative, Urine 

Bilirubin Negative, Urine Urobilinogen Normal, Urine Leukocyte Esterase Negative

, Urine RBC 0, Urine WBC 0, Urine Squamous Epithelial Cells None, Urine 

Bacteria None


4/5/18 06:37: 


Prostate Specific Antigen 0.18, Vitamin D 25-Hydroxy [Pending], 25-Hydroxy 

Vitamin D2 [Pending], 25-Hydroxy Vitamin D3 [Pending]


Height (Feet):  6


Height (Inches):  2.00


Weight (Pounds):  180


General Appearance:  WD/WN


Cardiovascular:  no JVD


Respiratory/Chest:  lungs clear


Abdomen:  non tender, soft











HUONG MERCER Apr 5, 2018 19:50

## 2018-04-06 VITALS — DIASTOLIC BLOOD PRESSURE: 66 MMHG | SYSTOLIC BLOOD PRESSURE: 123 MMHG

## 2018-04-06 VITALS — DIASTOLIC BLOOD PRESSURE: 61 MMHG | SYSTOLIC BLOOD PRESSURE: 137 MMHG

## 2018-04-06 VITALS — DIASTOLIC BLOOD PRESSURE: 73 MMHG | SYSTOLIC BLOOD PRESSURE: 124 MMHG

## 2018-04-06 VITALS — SYSTOLIC BLOOD PRESSURE: 121 MMHG | DIASTOLIC BLOOD PRESSURE: 83 MMHG

## 2018-04-06 VITALS — DIASTOLIC BLOOD PRESSURE: 72 MMHG | SYSTOLIC BLOOD PRESSURE: 143 MMHG

## 2018-04-06 VITALS — DIASTOLIC BLOOD PRESSURE: 70 MMHG | SYSTOLIC BLOOD PRESSURE: 125 MMHG

## 2018-04-06 RX ADMIN — ZOLPIDEM TARTRATE PRN MG: 5 TABLET ORAL at 20:29

## 2018-04-06 RX ADMIN — HEPARIN SODIUM SCH UNITS: 5000 INJECTION INTRAVENOUS; SUBCUTANEOUS at 20:30

## 2018-04-06 RX ADMIN — PREGABALIN SCH MG: 50 CAPSULE ORAL at 13:04

## 2018-04-06 RX ADMIN — TAMSULOSIN HYDROCHLORIDE SCH MG: 0.4 CAPSULE ORAL at 20:29

## 2018-04-06 RX ADMIN — HEPARIN SODIUM SCH UNITS: 5000 INJECTION INTRAVENOUS; SUBCUTANEOUS at 09:57

## 2018-04-06 RX ADMIN — ALPRAZOLAM SCH MG: 0.5 TABLET ORAL at 09:52

## 2018-04-06 RX ADMIN — ALPRAZOLAM SCH MG: 0.5 TABLET ORAL at 18:00

## 2018-04-06 RX ADMIN — ZOLPIDEM TARTRATE PRN MG: 5 TABLET ORAL at 00:18

## 2018-04-06 RX ADMIN — PREGABALIN SCH MG: 50 CAPSULE ORAL at 09:52

## 2018-04-06 RX ADMIN — HYDROCODONE BITARTRATE AND ACETAMINOPHEN PRN TAB: 10; 325 TABLET ORAL at 22:40

## 2018-04-06 RX ADMIN — HYDROCODONE BITARTRATE AND ACETAMINOPHEN PRN TAB: 10; 325 TABLET ORAL at 18:19

## 2018-04-06 RX ADMIN — PREGABALIN SCH MG: 50 CAPSULE ORAL at 18:00

## 2018-04-06 NOTE — NEPHROLOGY PROGRESS NOTE
Assessment/Plan


Problem List:  


(1) Cervical radiculopathy


(2) Intractable back pain


(3) Urinary retention


Assessment


PSA low at 0.18


Plan


cont flow max


pain meds


watch BP


will discuss with Dr Hadley





Subjective


Subjective


wants surgery on his R shoulder





Objective


Objective





Last 24 Hour Vital Signs








  Date Time  Temp Pulse Resp B/P (MAP) Pulse Ox O2 Delivery O2 Flow Rate FiO2


 


4/6/18 11:46 96.8 70 20 125/70 99   





 96.8       


 


4/6/18 08:00 97.1 65 20 137/61 98   





 97.1       


 


4/6/18 04:00 97.3 64 18 124/73 100   





 97.3       


 


4/6/18 00:00 97.3 62 18 123/66 100   





 97.3       


 


4/5/18 20:00 97.0 64 20 130/69 100   





 97.0       


 


4/5/18 15:49 96.9 60 20 124/70 100   





 96.9       

















Intake and Output  


 


 4/5/18 4/6/18





 19:00 07:00


 


Intake Total 730 ml 800 ml


 


Output Total 700 ml 


 


Balance 30 ml 800 ml


 


  


 


Intake Oral 730 ml 800 ml


 


Output Urine Total 700 ml 


 


# Voids  1


 


# Bowel Movements 1 1








Height (Feet):  6


Height (Inches):  2.00


Weight (Pounds):  180


Cardiovascular:  normal rate


Respiratory/Chest:  lungs clear











JESSICA SMITH Apr 6, 2018 13:48

## 2018-04-07 VITALS — DIASTOLIC BLOOD PRESSURE: 76 MMHG | SYSTOLIC BLOOD PRESSURE: 113 MMHG

## 2018-04-07 VITALS — SYSTOLIC BLOOD PRESSURE: 117 MMHG | DIASTOLIC BLOOD PRESSURE: 64 MMHG

## 2018-04-07 VITALS — SYSTOLIC BLOOD PRESSURE: 138 MMHG | DIASTOLIC BLOOD PRESSURE: 69 MMHG

## 2018-04-07 VITALS — SYSTOLIC BLOOD PRESSURE: 145 MMHG | DIASTOLIC BLOOD PRESSURE: 80 MMHG

## 2018-04-07 VITALS — SYSTOLIC BLOOD PRESSURE: 145 MMHG | DIASTOLIC BLOOD PRESSURE: 74 MMHG

## 2018-04-07 VITALS — DIASTOLIC BLOOD PRESSURE: 78 MMHG | SYSTOLIC BLOOD PRESSURE: 154 MMHG

## 2018-04-07 RX ADMIN — PREGABALIN SCH MG: 50 CAPSULE ORAL at 08:45

## 2018-04-07 RX ADMIN — HYDROCODONE BITARTRATE AND ACETAMINOPHEN PRN TAB: 10; 325 TABLET ORAL at 19:52

## 2018-04-07 RX ADMIN — TAMSULOSIN HYDROCHLORIDE SCH MG: 0.4 CAPSULE ORAL at 19:51

## 2018-04-07 RX ADMIN — HEPARIN SODIUM SCH UNITS: 5000 INJECTION INTRAVENOUS; SUBCUTANEOUS at 19:53

## 2018-04-07 RX ADMIN — ALPRAZOLAM SCH MG: 0.5 TABLET ORAL at 17:34

## 2018-04-07 RX ADMIN — HYDROCODONE BITARTRATE AND ACETAMINOPHEN PRN TAB: 10; 325 TABLET ORAL at 06:40

## 2018-04-07 RX ADMIN — PREGABALIN SCH MG: 50 CAPSULE ORAL at 13:26

## 2018-04-07 RX ADMIN — HYDROCODONE BITARTRATE AND ACETAMINOPHEN PRN TAB: 10; 325 TABLET ORAL at 02:40

## 2018-04-07 RX ADMIN — HYDROCODONE BITARTRATE AND ACETAMINOPHEN PRN TAB: 10; 325 TABLET ORAL at 11:30

## 2018-04-07 RX ADMIN — ALPRAZOLAM SCH MG: 0.5 TABLET ORAL at 08:45

## 2018-04-07 RX ADMIN — HYDROCODONE BITARTRATE AND ACETAMINOPHEN PRN TAB: 10; 325 TABLET ORAL at 15:41

## 2018-04-07 RX ADMIN — PREGABALIN SCH MG: 50 CAPSULE ORAL at 17:34

## 2018-04-07 RX ADMIN — HEPARIN SODIUM SCH UNITS: 5000 INJECTION INTRAVENOUS; SUBCUTANEOUS at 08:45

## 2018-04-07 NOTE — NEPHROLOGY PROGRESS NOTE
Assessment/Plan


Problem List:  


(1) Cervical radiculopathy


(2) Intractable back pain


(3) Urinary retention


Assessment


PSA low at 0.18


Plan


cont flow max


pain meds


check Vit D


Discussed with Dr Hadley





Subjective


Subjective


In NAD





Objective


Objective





Last 24 Hour Vital Signs








  Date Time  Temp Pulse Resp B/P (MAP) Pulse Ox O2 Delivery O2 Flow Rate FiO2


 


4/7/18 12:01 97.9 69 20 154/78 100   





 97.9       


 


4/7/18 08:09 97.3 65 20 117/64 99   





 97.3       


 


4/7/18 05:31 97.0 68 16 113/76 98   





 97.0       


 


4/7/18 04:00 96.9 68 16 113/76 98   





 96.9       


 


4/7/18 00:00 97.9 81 17 145/80 93   





 97.9       


 


4/6/18 20:00 97.0 61 14 143/72 100   





 97.0       


 


4/6/18 15:34 96.9 71 20 121/83 99   





 96.9       

















Intake and Output  


 


 4/6/18 4/7/18





 19:00 07:00


 


Intake Total 970 ml 2100 ml


 


Output Total  1900 ml


 


Balance 970 ml 200 ml


 


  


 


Intake Oral 970 ml 2100 ml


 


Output Urine Total  1900 ml


 


# Voids 4 


 


# Bowel Movements 1 1








Height (Feet):  6


Height (Inches):  2.00


Weight (Pounds):  180


Cardiovascular:  normal rate


Respiratory/Chest:  lungs clear











JESSICA SMITH Apr 7, 2018 14:44

## 2018-04-08 VITALS — SYSTOLIC BLOOD PRESSURE: 159 MMHG | DIASTOLIC BLOOD PRESSURE: 84 MMHG

## 2018-04-08 VITALS — DIASTOLIC BLOOD PRESSURE: 73 MMHG | SYSTOLIC BLOOD PRESSURE: 153 MMHG

## 2018-04-08 VITALS — DIASTOLIC BLOOD PRESSURE: 78 MMHG | SYSTOLIC BLOOD PRESSURE: 153 MMHG

## 2018-04-08 VITALS — SYSTOLIC BLOOD PRESSURE: 108 MMHG | DIASTOLIC BLOOD PRESSURE: 59 MMHG

## 2018-04-08 VITALS — SYSTOLIC BLOOD PRESSURE: 130 MMHG | DIASTOLIC BLOOD PRESSURE: 67 MMHG

## 2018-04-08 VITALS — SYSTOLIC BLOOD PRESSURE: 147 MMHG | DIASTOLIC BLOOD PRESSURE: 68 MMHG

## 2018-04-08 RX ADMIN — PREGABALIN SCH MG: 50 CAPSULE ORAL at 18:04

## 2018-04-08 RX ADMIN — ALPRAZOLAM SCH MG: 0.5 TABLET ORAL at 18:00

## 2018-04-08 RX ADMIN — HYDROCODONE BITARTRATE AND ACETAMINOPHEN PRN TAB: 10; 325 TABLET ORAL at 21:28

## 2018-04-08 RX ADMIN — HYDROCODONE BITARTRATE AND ACETAMINOPHEN PRN TAB: 10; 325 TABLET ORAL at 05:31

## 2018-04-08 RX ADMIN — TAMSULOSIN HYDROCHLORIDE SCH MG: 0.4 CAPSULE ORAL at 21:27

## 2018-04-08 RX ADMIN — PREGABALIN SCH MG: 50 CAPSULE ORAL at 12:55

## 2018-04-08 RX ADMIN — HEPARIN SODIUM SCH UNITS: 5000 INJECTION INTRAVENOUS; SUBCUTANEOUS at 21:29

## 2018-04-08 RX ADMIN — HYDROCODONE BITARTRATE AND ACETAMINOPHEN PRN TAB: 10; 325 TABLET ORAL at 17:30

## 2018-04-08 RX ADMIN — PREGABALIN SCH MG: 50 CAPSULE ORAL at 09:00

## 2018-04-08 RX ADMIN — HYDROCODONE BITARTRATE AND ACETAMINOPHEN PRN TAB: 10; 325 TABLET ORAL at 09:30

## 2018-04-08 RX ADMIN — HEPARIN SODIUM SCH UNITS: 5000 INJECTION INTRAVENOUS; SUBCUTANEOUS at 09:00

## 2018-04-08 RX ADMIN — HYDROCODONE BITARTRATE AND ACETAMINOPHEN PRN TAB: 10; 325 TABLET ORAL at 13:39

## 2018-04-08 RX ADMIN — ALPRAZOLAM SCH MG: 0.5 TABLET ORAL at 09:00

## 2018-04-08 RX ADMIN — HYDROCODONE BITARTRATE AND ACETAMINOPHEN PRN TAB: 10; 325 TABLET ORAL at 00:06

## 2018-04-08 NOTE — GENERAL PROGRESS NOTE
Assessment/Plan


Problem List:  


(1) Cervical radiculopathy


ICD Codes:  M54.12 - Radiculopathy, cervical region


SNOMED:  67504206


(2) Intractable back pain


ICD Codes:  M54.9 - Dorsalgia, unspecified


SNOMED:  427429146


(3) Urinary retention


ICD Codes:  R33.9 - Retention of urine, unspecified


SNOMED:  224304809


Assessment/Plan


Po Pain meds


await placement





Subjective


Allergies:  


Coded Allergies:  


     No Known Allergies (Verified  Allergy, Unknown, 11/29/06)


Subjective


seen for Dr Hadley


feels the same





Objective





Last 24 Hour Vital Signs








  Date Time  Temp Pulse Resp B/P (MAP) Pulse Ox O2 Delivery O2 Flow Rate FiO2


 


4/8/18 12:00 97.6 69 18 153/78 100 Room Air  





 97.6       


 


4/8/18 07:59 97.6 65 18 159/84 99 Room Air  





 97.6       


 


4/8/18 06:29 97.9       


 


4/8/18 05:31 97.9       


 


4/8/18 04:00     98 Room Air  


 


4/8/18 04:00 97.9 60 19 108/59 98 Room Air  





 97.9       


 


4/8/18 00:06 97.7       


 


4/8/18 00:00     100 Room Air  


 


4/8/18 00:00 97.7 60 18 153/73 100 Room Air  





 97.7       


 


4/7/18 20:33     100 Room Air  


 


4/7/18 20:02 97.7 61 20 138/69 100 Room Air  





 97.7       


 


4/7/18 19:52 98.0       


 


4/7/18 15:43 98.0 61 20 145/74 100   





 98.0       

















Intake and Output  


 


 4/7/18 4/8/18





 19:00 07:00


 


Intake Total 780 ml 


 


Balance 780 ml 


 


  


 


Intake Oral 780 ml 


 


# Voids  2








Height (Feet):  6


Height (Inches):  2.00


Weight (Pounds):  180


Cardiovascular:  normal rate


Respiratory/Chest:  lungs clear


Edema:  no edema noted JESSICA Melton Apr 8, 2018 14:02

## 2018-04-08 NOTE — GENERAL PROGRESS NOTE
Assessment/Plan


Status Narrative


impression:





polyarhtralgia


 cervical disc disease


 failed bacjk syndroem 


history of septic spien 


history of septic hi[p 


plan 


transfer to rehab when room available.





Subjective


Date patient seen:  Apr 8, 2018


Time patient seen:  19:58


HEENT:  Reports: no symptoms


Allergies:  


Coded Allergies:  


     No Known Allergies (Verified  Allergy, Unknown, 11/29/06)


Subjective


has shoudler and neck pain





Objective





Last 24 Hour Vital Signs








  Date Time  Temp Pulse Resp B/P (MAP) Pulse Ox O2 Delivery O2 Flow Rate FiO2


 


4/8/18 15:54 97.3 56 18 130/67 99 Room Air  





 97.3       


 


4/8/18 12:00 97.6 69 18 153/78 100 Room Air  





 97.6       


 


4/8/18 07:59 97.6 65 18 159/84 99 Room Air  





 97.6       


 


4/8/18 06:29 97.9       


 


4/8/18 05:31 97.9       


 


4/8/18 04:00     98 Room Air  


 


4/8/18 04:00 97.9 60 19 108/59 98 Room Air  





 97.9       


 


4/8/18 00:06 97.7       


 


4/8/18 00:00     100 Room Air  


 


4/8/18 00:00 97.7 60 18 153/73 100 Room Air  





 97.7       


 


4/7/18 20:33     100 Room Air  


 


4/7/18 20:02 97.7 61 20 138/69 100 Room Air  





 97.7       

















Intake and Output  


 


 4/7/18 4/8/18





 19:00 07:00


 


Intake Total 780 ml 


 


Balance 780 ml 


 


  


 


Intake Oral 780 ml 


 


# Voids  2








no jvd


 cta 


s12,sw,frrr


 soft has a trach site wound wel lhealed 


cta


soft s1.s2.rrr


Height (Feet):  6


Height (Inches):  2.00


Weight (Pounds):  180











HUONG MERCER Apr 8, 2018 19:59

## 2018-04-09 VITALS — SYSTOLIC BLOOD PRESSURE: 118 MMHG | DIASTOLIC BLOOD PRESSURE: 65 MMHG

## 2018-04-09 VITALS — SYSTOLIC BLOOD PRESSURE: 148 MMHG | DIASTOLIC BLOOD PRESSURE: 74 MMHG

## 2018-04-09 VITALS — SYSTOLIC BLOOD PRESSURE: 141 MMHG | DIASTOLIC BLOOD PRESSURE: 76 MMHG

## 2018-04-09 VITALS — SYSTOLIC BLOOD PRESSURE: 134 MMHG | DIASTOLIC BLOOD PRESSURE: 69 MMHG

## 2018-04-09 RX ADMIN — HYDROCODONE BITARTRATE AND ACETAMINOPHEN PRN TAB: 10; 325 TABLET ORAL at 09:03

## 2018-04-09 RX ADMIN — HEPARIN SODIUM SCH UNITS: 5000 INJECTION INTRAVENOUS; SUBCUTANEOUS at 09:05

## 2018-04-09 RX ADMIN — ALPRAZOLAM SCH MG: 0.5 TABLET ORAL at 09:04

## 2018-04-09 RX ADMIN — PREGABALIN SCH MG: 50 CAPSULE ORAL at 09:04

## 2018-04-09 RX ADMIN — HYDROCODONE BITARTRATE AND ACETAMINOPHEN PRN TAB: 10; 325 TABLET ORAL at 04:45

## 2018-04-10 NOTE — DISCHARGE SUMMARY
Discharge Summary


DATE OF ADMISSION: 04/03/2018





DATE OF DISCHARGE: 04/09/2018





REASON FOR ADMISSION: 


57 years old male with history of multiple back surgeries,  history of septic 

spine and septic hip,  chronic back pain, hypertension, GERD, hepatitis C, 


s/p treatment, presented to emergency department for evaluation.  Patient 

complained of severe neck pain and back pain.  He denied recent trauma.  He 

reported pain tingling down his right arm.  Patient apparently had surgery in 

his back  few months ago.  Patient reported that  pain medications were not 

helping.  Patient reported pain 10 out of 10 ,sharp, radiating down both legs 

and down the right arm.  Patient denied chest pain, shortness of breath.  Vital 

signs were stable.  CT of the cervical spine revealed no acute bony trauma, 

however it showed  degenerative multilevel changes.  CT L-spine revealed 

multilevel lower thoracic and lumbar fusion.  Diffuse anterior interbody and 

posterior facet fusion.  Multilevel laminectomy.  Prominent bone spur 

projecting into the thecal sac at  L4-L5.  Laboratory workup  was unremarkable 

, except potassium -6.3.  Patient was admitted with diagnosis of cervical 

radiculopathy,  chronic back pain, chronic pain.   


 


CONSULTANTS:


nephrologist Dr. Patricia


detox specialist Dr. Valenzuela


 


[facility] COURSE: 


Patient admitted to medical surgical floor.  Nephrology and detox specialist 

consults were requested.  Pain management was addressed with different 

analgesics including Soma, Lyrica and Dilaudid. Patient was closely monitored.  

Patient was working with physical and occupational therapists.  Full 

precautions were maintained.  Hyperkalemia was corrected, potassium down to 4.2 

after treatment.  Vitamin D level low -26,  patient will need replacement of 

vitamin D as outpatient.   Patient status post treatment for hepatitis C.  LFT 

were within normal limits.  Flomax was continued , no difficulty with voiding.  

Blood pressure was managed with calcium channel blocker with holding parameters 

as needed.  Initial plan was to transfer patient to [facility] for opiate detoxification  program.  However, unable to 

transfer.  Unable to transfer patient into  the skilled nursing facility as 

well .


Patient  was discharged home. Patient was stable for discharge.





FINAL DIAGNOSES: 


Cervical disc disease 


Acute radicular syndrome  


Failed back syndrome


Chronic pain 


Lumbar stenosis 


History of septic spine


History of septic hip


Polyarthralgia


Hypertension


Hepatitis C , status post treatment 


BPH


Hyperkalemia,  resolved





DISCHARGE MEDICATIONS:


See Medication Reconciliation list.





DISCHARGE INSTRUCTIONS:


Patient was discharged home 


Follow up with primary care provide in one week.





I have been assigned to dictate discharge summary for this account. I was not 

involved in the patient's management.











Tim (VeliaTricia ortega NP Apr 10, 2018 13:48

## 2018-11-06 ENCOUNTER — HOSPITAL ENCOUNTER (EMERGENCY)
Dept: HOSPITAL 72 - EMR | Age: 58
Discharge: NURSING, REHAB OR LONG-TERM CARE | End: 2018-11-06
Payer: MEDICARE

## 2018-11-06 VITALS — SYSTOLIC BLOOD PRESSURE: 117 MMHG | DIASTOLIC BLOOD PRESSURE: 67 MMHG

## 2018-11-06 VITALS — SYSTOLIC BLOOD PRESSURE: 116 MMHG | DIASTOLIC BLOOD PRESSURE: 69 MMHG

## 2018-11-06 VITALS — BODY MASS INDEX: 32.14 KG/M2 | WEIGHT: 200 LBS | HEIGHT: 66 IN

## 2018-11-06 DIAGNOSIS — Z98.890: ICD-10-CM

## 2018-11-06 DIAGNOSIS — M25.511: Primary | ICD-10-CM

## 2018-11-06 DIAGNOSIS — I10: ICD-10-CM

## 2018-11-06 DIAGNOSIS — M54.2: ICD-10-CM

## 2018-11-06 DIAGNOSIS — G89.29: ICD-10-CM

## 2018-11-06 PROCEDURE — 73030 X-RAY EXAM OF SHOULDER: CPT

## 2018-11-06 PROCEDURE — 99283 EMERGENCY DEPT VISIT LOW MDM: CPT

## 2018-11-06 PROCEDURE — 96372 THER/PROPH/DIAG INJ SC/IM: CPT

## 2018-11-06 NOTE — DIAGNOSTIC IMAGING REPORT
Indication: Right shoulder pain

 

Findings: 3  views of the right shoulder were obtained. 

 

No acute fractures, malalignment, erosions or periostitis are identified. Soft

tissues are unremarkable.

 

Impression: Negative for acute injury

## 2018-11-06 NOTE — EMERGENCY ROOM REPORT
History of Present Illness


General


Chief Complaint:  Pain


Source:  Patient





Present Illness


HPI


This is a 58-year-old male coming from a nursing home.  He has a history of 

chronic pain with multiple surgery in the past.  He has chronic neck pain with 

arm pain.  He scheduled for surgery at [facility] in the near future.  He 

call 911 from the nursing home because he said he can raise his right arm.  

Pain is 10 out of 10.  Worse with movement.  No fever chills.  Most of his pain 

is localized the neck and shoulder.


Allergies:  


Coded Allergies:  


     No Known Allergies (Verified  Allergy, Unknown, 11/29/06)





Patient History


Past Medical History:  see triage record, old chart reviewed


Past Surgical History:  other


Pertinent Family History:  none


Social History:  Denies: smoking


Immunizations:  other


Reviewed Nursing Documentation:  PMH: Agreed; PSxH: Agreed





Nursing Documentation-PMH


Past Medical History:  No Stated History


Hx Cardiac Problems:  Yes


Hx Hypertension:  Yes


Hx Cancer:  No


Hx Neurological Problems:  No





Review of Systems


Eye:  Denies: eye pain, blurred vision


ENT:  Denies: ear pain, nose congestion, throat swelling


Respiratory:  Denies: cough, shortness of breath


Cardiovascular:  Denies: chest pain, palpitations


Gastrointestinal:  Denies: abdominal pain, diarrhea, nausea, vomiting


Musculoskeletal:  Reports: joint pain; Denies: back pain


Skin:  Denies: rash


Neurological:  Denies: headache, numbness


Endocrine:  Denies: increased thirst, increased urine


Hematologic/Lymphatic:  Denies: easy bruising


All Other Systems:  negative except mentioned in HPI





Physical Exam





Vital Signs








  Date Time  Temp Pulse Resp B/P (MAP) Pulse Ox O2 Delivery O2 Flow Rate FiO2


 


11/6/18 05:00 98.1 65 18 127/78 98 Room Air  





vitals normal


Sp02 EP Interpretation:  reviewed, normal


General Appearance:  well appearing, no apparent distress, alert


Head:  normocephalic, atraumatic


Eyes:  bilateral eye PERRL, bilateral eye EOMI


ENT:  hearing grossly normal, normal pharynx


Neck:  full range of motion, supple, no meningismus


Respiratory:  chest non-tender, lungs clear, normal breath sounds


Cardiovascular #1:  regular rate, rhythm, no murmur


Gastrointestinal:  normal bowel sounds, non tender, no mass, no organomegaly, 

no bruit, non-distended


Musculoskeletal:  back normal, tender - Diffuse pain over the shoulder. No 

deformity


Neurologic:  alert, oriented x3


Psychiatric:  mood/affect normal


Skin:  warm/dry





Medical Decision Making


Diagnostic Impression:  


 Primary Impression:  


 Shoulder pain, acute


 Qualified Codes:  M25.511 - Pain in right shoulder


 Additional Impression:  


 Chronic pain


 Qualified Codes:  G89.4 - Chronic pain syndrome


ER Course


Patient with chronic pain.  No trauma.  No evidence of cauda equina syndrome, 

spinal after abscess or neoplastic process.  No evidence of septic joint.  We'

ll discharge home.





Last Vital Signs








  Date Time  Temp Pulse Resp B/P (MAP) Pulse Ox O2 Delivery O2 Flow Rate FiO2


 


11/6/18 05:16 98.1 72 18 117/67 98 Room Air  








Status:  improved


Disposition:  XFER SNF


Condition:  Stable





Additional Instructions:  


Follow-up with your doctor in a week.  Return if symptom worsen.











Dharmesh Ngo MD Nov 6, 2018 05:21

## 2018-11-06 NOTE — EMERGENCY ROOM REPORT
Physical Exam





Vital Signs








  Date Time  Temp Pulse Resp B/P (MAP) Pulse Ox O2 Delivery O2 Flow Rate FiO2


 


11/6/18 05:00 98.1 65 18 127/78 98 Room Air  











Medical Decision Making


Diagnostic Impression:  


 Primary Impression:  


 Shoulder pain, acute


 Qualified Codes:  M25.511 - Pain in right shoulder


 Additional Impression:  


 Chronic pain


 Qualified Codes:  G89.4 - Chronic pain syndrome





Other X-Ray Diagnostic Results


Other X-Ray Diagnostic Results :  


   X-Ray ordered:  Right shoulder x-rays


   # of Views/Limited Vs Complete:  3 View


   Indication:  Pain


   EP Interpretation:  Yes


   Interpretation:  no dislocation, no soft tissue swelling, no fractures


   Impression:  No acute disease


   Electronically Signed by:  Dharmesh Ngo MD





Last Vital Signs








  Date Time  Temp Pulse Resp B/P (MAP) Pulse Ox O2 Delivery O2 Flow Rate FiO2


 


11/6/18 05:16 98.1 72 18 117/67 98 Room Air  








Status:  improved


Disposition:  ER SNF


Condition:  Stable


Patient Instructions:  Pain Without a Known Cause





Additional Instructions:  


Follow-up with your doctor in a week.  Return if symptom worsen.











Dharmesh Ngo MD Nov 6, 2018 05:23

## 2018-11-20 ENCOUNTER — HOSPITAL ENCOUNTER (INPATIENT)
Dept: HOSPITAL 87 - ER | Age: 58
LOS: 5 days | Discharge: LEFT BEFORE DISCHARGE | DRG: 92 | End: 2018-11-25
Attending: INTERNAL MEDICINE | Admitting: INTERNAL MEDICINE
Payer: MEDICARE

## 2018-11-20 VITALS — WEIGHT: 200 LBS | HEIGHT: 74 IN | BODY MASS INDEX: 25.67 KG/M2

## 2018-11-20 DIAGNOSIS — I10: ICD-10-CM

## 2018-11-20 DIAGNOSIS — M48.02: ICD-10-CM

## 2018-11-20 DIAGNOSIS — F10.10: ICD-10-CM

## 2018-11-20 DIAGNOSIS — Z98.1: ICD-10-CM

## 2018-11-20 DIAGNOSIS — K52.9: ICD-10-CM

## 2018-11-20 DIAGNOSIS — J98.11: ICD-10-CM

## 2018-11-20 DIAGNOSIS — Z91.81: ICD-10-CM

## 2018-11-20 DIAGNOSIS — M47.12: ICD-10-CM

## 2018-11-20 DIAGNOSIS — Z53.21: ICD-10-CM

## 2018-11-20 DIAGNOSIS — K80.20: ICD-10-CM

## 2018-11-20 DIAGNOSIS — G95.20: Primary | ICD-10-CM

## 2018-11-20 DIAGNOSIS — E87.0: ICD-10-CM

## 2018-11-20 DIAGNOSIS — F17.210: ICD-10-CM
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
## 2018-11-20 DIAGNOSIS — B19.20: ICD-10-CM

## 2018-11-20 DIAGNOSIS — K76.0: ICD-10-CM

## 2018-11-20 LAB
APTT PPP: 23.1 SEC (ref 23.4–31)
BASOPHILS NFR BLD AUTO: 1.1 % (ref 0–2)
CHLORIDE SERPL-SCNC: 107 MEQ/L (ref 98–107)
EOSINOPHIL NFR BLD AUTO: 5.4 % (ref 0–5)
ERYTHROCYTE [DISTWIDTH] IN BLOOD BY AUTOMATED COUNT: 15.7 % (ref 11.6–14.6)
ETHANOL SERPL-MCNC: 234 MG/DL
HCT VFR BLD AUTO: 43.2 % (ref 42–52)
HGB BLD-MCNC: 14.5 G/DL (ref 14–18)
INR PPP: 1
LYMPHOCYTES NFR BLD AUTO: 28.8 % (ref 20–50)
MCH RBC QN AUTO: 30.1 PG (ref 28–32)
MCV RBC AUTO: 89.9 FL (ref 80–94)
MONOCYTES NFR BLD AUTO: 10.1 % (ref 2–8)
NEUTROPHILS NFR BLD AUTO: 54.6 % (ref 40–76)
PLATELET # BLD AUTO: 280 X1000/UL (ref 130–400)
PMV BLD AUTO: 8.2 FL (ref 7.4–10.4)
PROTHROMBIN TIME: 9.9 SEC (ref 9.1–11.1)
RBC # BLD AUTO: 4.81 MILL/UL (ref 4.7–6.1)

## 2018-11-20 PROCEDURE — 71045 X-RAY EXAM CHEST 1 VIEW: CPT

## 2018-11-20 PROCEDURE — 93005 ELECTROCARDIOGRAM TRACING: CPT

## 2018-11-20 PROCEDURE — 86705 HEP B CORE ANTIBODY IGM: CPT

## 2018-11-20 PROCEDURE — 84484 ASSAY OF TROPONIN QUANT: CPT

## 2018-11-20 PROCEDURE — 72146 MRI CHEST SPINE W/O DYE: CPT

## 2018-11-20 PROCEDURE — 80305 DRUG TEST PRSMV DIR OPT OBS: CPT

## 2018-11-20 PROCEDURE — 96374 THER/PROPH/DIAG INJ IV PUSH: CPT

## 2018-11-20 PROCEDURE — 86709 HEPATITIS A IGM ANTIBODY: CPT

## 2018-11-20 PROCEDURE — 86803 HEPATITIS C AB TEST: CPT

## 2018-11-20 PROCEDURE — 96361 HYDRATE IV INFUSION ADD-ON: CPT

## 2018-11-20 PROCEDURE — 96376 TX/PRO/DX INJ SAME DRUG ADON: CPT

## 2018-11-20 PROCEDURE — 83880 ASSAY OF NATRIURETIC PEPTIDE: CPT

## 2018-11-20 PROCEDURE — 70551 MRI BRAIN STEM W/O DYE: CPT

## 2018-11-20 PROCEDURE — 97162 PT EVAL MOD COMPLEX 30 MIN: CPT

## 2018-11-20 PROCEDURE — 72141 MRI NECK SPINE W/O DYE: CPT

## 2018-11-20 PROCEDURE — 80076 HEPATIC FUNCTION PANEL: CPT

## 2018-11-20 PROCEDURE — 99285 EMERGENCY DEPT VISIT HI MDM: CPT

## 2018-11-20 PROCEDURE — 73030 X-RAY EXAM OF SHOULDER: CPT

## 2018-11-20 PROCEDURE — 87340 HEPATITIS B SURFACE AG IA: CPT

## 2018-11-20 PROCEDURE — 96375 TX/PRO/DX INJ NEW DRUG ADDON: CPT

## 2018-11-20 PROCEDURE — 74176 CT ABD & PELVIS W/O CONTRAST: CPT

## 2018-11-20 PROCEDURE — 82248 BILIRUBIN DIRECT: CPT

## 2018-11-20 PROCEDURE — 80048 BASIC METABOLIC PNL TOTAL CA: CPT

## 2018-11-20 PROCEDURE — 36415 COLL VENOUS BLD VENIPUNCTURE: CPT

## 2018-11-21 VITALS — SYSTOLIC BLOOD PRESSURE: 152 MMHG | DIASTOLIC BLOOD PRESSURE: 74 MMHG

## 2018-11-21 VITALS — DIASTOLIC BLOOD PRESSURE: 71 MMHG | SYSTOLIC BLOOD PRESSURE: 136 MMHG

## 2018-11-21 VITALS — DIASTOLIC BLOOD PRESSURE: 68 MMHG | SYSTOLIC BLOOD PRESSURE: 138 MMHG

## 2018-11-21 VITALS — SYSTOLIC BLOOD PRESSURE: 160 MMHG | DIASTOLIC BLOOD PRESSURE: 84 MMHG

## 2018-11-21 VITALS — SYSTOLIC BLOOD PRESSURE: 154 MMHG | DIASTOLIC BLOOD PRESSURE: 79 MMHG

## 2018-11-21 VITALS — DIASTOLIC BLOOD PRESSURE: 79 MMHG | SYSTOLIC BLOOD PRESSURE: 154 MMHG

## 2018-11-21 VITALS — DIASTOLIC BLOOD PRESSURE: 74 MMHG | SYSTOLIC BLOOD PRESSURE: 134 MMHG

## 2018-11-21 LAB
AMPHETAMINES UR QL SCN: NEGATIVE
APPEARANCE UR: (no result)
BARBITURATES UR QL SCN: NEGATIVE
BASOPHILS NFR BLD MANUAL: 1 % (ref 0–2)
BENZODIAZ UR QL SCN: (no result)
BZE UR QL SCN: NEGATIVE
CANNABINOIDS UR QL SCN: NEGATIVE
CHLORIDE SERPL-SCNC: 104 MEQ/L (ref 98–107)
COLOR UR: YELLOW
EOSINOPHIL NFR BLD MANUAL: 4 % (ref 0–5)
ERYTHROCYTE [DISTWIDTH] IN BLOOD BY AUTOMATED COUNT: 15.6 % (ref 11.6–14.6)
HAV IGM SER QL: NEGATIVE
HBV SURFACE AB SER-ACNC: NEGATIVE M[IU]/ML
HCT VFR BLD AUTO: 36.4 % (ref 42–52)
HGB BLD-MCNC: 12.2 G/DL (ref 14–18)
HGB UR QL STRIP: (no result)
KETONES UR STRIP-MCNC: (no result) MG/DL
LEUKOCYTE ESTERASE UR QL STRIP: (no result)
LYMPHOCYTES NFR BLD MANUAL: 30 % (ref 20–50)
MCH RBC QN AUTO: 30 PG (ref 28–32)
MCV RBC AUTO: 89.7 FL (ref 80–94)
METHADONE UR QL SCN: NEGATIVE
MONOCYTES NFR BLD MANUAL: 14 % (ref 2–8)
NEUTS BAND NFR BLD MANUAL: 1 % (ref 1–6)
NEUTS SEG NFR BLD MANUAL: 50 % (ref 45–75)
NITRITE UR QL STRIP: NEGATIVE
OPIATES UR QL SCN: (no result)
PCP UR QL SCN: NEGATIVE
PH UR STRIP: 6.5 [PH] (ref 4.5–8)
PLATELET # BLD AUTO: 217 X1000/UL (ref 130–400)
PLATELET # BLD EST: NORMAL 10*3/UL
PMV BLD AUTO: 7.9 FL (ref 7.4–10.4)
PROT UR QL STRIP: NEGATIVE
RBC # BLD AUTO: 4.05 MILL/UL (ref 4.7–6.1)
SP GR UR STRIP: 1.02 (ref 1–1.03)
UROBILINOGEN UR STRIP-MCNC: 1 E.U./DL (ref 0.2–1)

## 2018-11-21 RX ADMIN — AMLODIPINE BESYLATE SCH MG: 5 TABLET ORAL at 12:12

## 2018-11-21 RX ADMIN — HYDROCODONE BITARTRATE AND ACETAMINOPHEN PRN TAB: 10; 325 TABLET ORAL at 01:38

## 2018-11-21 RX ADMIN — PANTOPRAZOLE SODIUM SCH MG: 40 TABLET, DELAYED RELEASE ORAL at 06:44

## 2018-11-21 RX ADMIN — HYDROCODONE BITARTRATE AND ACETAMINOPHEN PRN TAB: 10; 325 TABLET ORAL at 21:42

## 2018-11-21 RX ADMIN — HYDROCODONE BITARTRATE AND ACETAMINOPHEN PRN TAB: 10; 325 TABLET ORAL at 08:46

## 2018-11-21 RX ADMIN — Medication SCH UDTAB: at 08:45

## 2018-11-21 RX ADMIN — CHLORDIAZEPOXIDE HYDROCHLORIDE SCH MG: 25 CAPSULE ORAL at 21:42

## 2018-11-21 RX ADMIN — CHLORDIAZEPOXIDE HYDROCHLORIDE SCH MG: 25 CAPSULE ORAL at 13:04

## 2018-11-21 RX ADMIN — Medication PRN MG: at 01:46

## 2018-11-21 RX ADMIN — AMLODIPINE BESYLATE SCH MG: 5 TABLET ORAL at 21:42

## 2018-11-21 RX ADMIN — FOLIC ACID SCH MG: 1 TABLET ORAL at 08:45

## 2018-11-21 RX ADMIN — Medication SCH MG: at 08:45

## 2018-11-21 RX ADMIN — HYDROCODONE BITARTRATE AND ACETAMINOPHEN PRN TAB: 10; 325 TABLET ORAL at 13:05

## 2018-11-22 VITALS — DIASTOLIC BLOOD PRESSURE: 56 MMHG | SYSTOLIC BLOOD PRESSURE: 132 MMHG

## 2018-11-22 VITALS — DIASTOLIC BLOOD PRESSURE: 65 MMHG | SYSTOLIC BLOOD PRESSURE: 141 MMHG

## 2018-11-22 VITALS — SYSTOLIC BLOOD PRESSURE: 145 MMHG | DIASTOLIC BLOOD PRESSURE: 75 MMHG

## 2018-11-22 VITALS — DIASTOLIC BLOOD PRESSURE: 55 MMHG | SYSTOLIC BLOOD PRESSURE: 153 MMHG

## 2018-11-22 VITALS — DIASTOLIC BLOOD PRESSURE: 73 MMHG | SYSTOLIC BLOOD PRESSURE: 116 MMHG

## 2018-11-22 VITALS — SYSTOLIC BLOOD PRESSURE: 146 MMHG | DIASTOLIC BLOOD PRESSURE: 90 MMHG

## 2018-11-22 LAB
BASOPHILS NFR BLD MANUAL: 1 % (ref 0–2)
CHLORIDE SERPL-SCNC: 105 MEQ/L (ref 98–107)
EOSINOPHIL NFR BLD MANUAL: 4 % (ref 0–5)
ERYTHROCYTE [DISTWIDTH] IN BLOOD BY AUTOMATED COUNT: 15.6 % (ref 11.6–14.6)
HCT VFR BLD AUTO: 37.8 % (ref 42–52)
HGB BLD-MCNC: 12.5 G/DL (ref 14–18)
LYMPHOCYTES NFR BLD MANUAL: 48 % (ref 20–50)
MCH RBC QN AUTO: 30.1 PG (ref 28–32)
MCV RBC AUTO: 90.6 FL (ref 80–94)
MONOCYTES NFR BLD MANUAL: 14 % (ref 2–8)
NEUTS SEG NFR BLD MANUAL: 33 % (ref 45–75)
PLATELET # BLD AUTO: 216 X1000/UL (ref 130–400)
PLATELET # BLD EST: NORMAL 10*3/UL
PMV BLD AUTO: 8.5 FL (ref 7.4–10.4)
RBC # BLD AUTO: 4.17 MILL/UL (ref 4.7–6.1)

## 2018-11-22 RX ADMIN — CHLORDIAZEPOXIDE HYDROCHLORIDE SCH MG: 25 CAPSULE ORAL at 14:00

## 2018-11-22 RX ADMIN — HYDROCODONE BITARTRATE AND ACETAMINOPHEN PRN TAB: 10; 325 TABLET ORAL at 21:01

## 2018-11-22 RX ADMIN — CHLORDIAZEPOXIDE HYDROCHLORIDE SCH MG: 25 CAPSULE ORAL at 22:00

## 2018-11-22 RX ADMIN — AMLODIPINE BESYLATE SCH MG: 5 TABLET ORAL at 20:53

## 2018-11-22 RX ADMIN — HYDROCODONE BITARTRATE AND ACETAMINOPHEN PRN TAB: 10; 325 TABLET ORAL at 01:42

## 2018-11-22 RX ADMIN — CHLORDIAZEPOXIDE HYDROCHLORIDE SCH MG: 25 CAPSULE ORAL at 06:21

## 2018-11-22 RX ADMIN — FOLIC ACID SCH MG: 1 TABLET ORAL at 09:21

## 2018-11-22 RX ADMIN — HYDROCODONE BITARTRATE AND ACETAMINOPHEN PRN TAB: 10; 325 TABLET ORAL at 14:53

## 2018-11-22 RX ADMIN — Medication SCH UDTAB: at 09:21

## 2018-11-22 RX ADMIN — AMLODIPINE BESYLATE SCH MG: 5 TABLET ORAL at 09:22

## 2018-11-22 RX ADMIN — PANTOPRAZOLE SODIUM SCH MG: 40 TABLET, DELAYED RELEASE ORAL at 06:21

## 2018-11-22 RX ADMIN — Medication SCH MG: at 09:22

## 2018-11-22 RX ADMIN — HYDROCODONE BITARTRATE AND ACETAMINOPHEN PRN TAB: 10; 325 TABLET ORAL at 09:22

## 2018-11-23 VITALS — SYSTOLIC BLOOD PRESSURE: 146 MMHG | DIASTOLIC BLOOD PRESSURE: 81 MMHG

## 2018-11-23 VITALS — SYSTOLIC BLOOD PRESSURE: 140 MMHG | DIASTOLIC BLOOD PRESSURE: 75 MMHG

## 2018-11-23 VITALS — DIASTOLIC BLOOD PRESSURE: 81 MMHG | SYSTOLIC BLOOD PRESSURE: 151 MMHG

## 2018-11-23 VITALS — SYSTOLIC BLOOD PRESSURE: 139 MMHG | DIASTOLIC BLOOD PRESSURE: 72 MMHG

## 2018-11-23 VITALS — SYSTOLIC BLOOD PRESSURE: 157 MMHG | DIASTOLIC BLOOD PRESSURE: 93 MMHG

## 2018-11-23 VITALS — SYSTOLIC BLOOD PRESSURE: 137 MMHG | DIASTOLIC BLOOD PRESSURE: 76 MMHG

## 2018-11-23 LAB
BASOPHILS NFR BLD AUTO: 0.3 % (ref 0–2)
CHLORIDE SERPL-SCNC: 106 MEQ/L (ref 98–107)
EOSINOPHIL NFR BLD AUTO: 4.2 % (ref 0–5)
ERYTHROCYTE [DISTWIDTH] IN BLOOD BY AUTOMATED COUNT: 15.7 % (ref 11.6–14.6)
HCT VFR BLD AUTO: 38.2 % (ref 42–52)
HGB BLD-MCNC: 12.4 G/DL (ref 14–18)
LYMPHOCYTES NFR BLD AUTO: 23.5 % (ref 20–50)
MCH RBC QN AUTO: 30.2 PG (ref 28–32)
MCV RBC AUTO: 92.5 FL (ref 80–94)
MONOCYTES NFR BLD AUTO: 10.4 % (ref 2–8)
NEUTROPHILS NFR BLD AUTO: 61.6 % (ref 40–76)
PLATELET # BLD AUTO: 193 X1000/UL (ref 130–400)
PMV BLD AUTO: 8.3 FL (ref 7.4–10.4)
RBC # BLD AUTO: 4.13 MILL/UL (ref 4.7–6.1)

## 2018-11-23 RX ADMIN — PANTOPRAZOLE SODIUM SCH MG: 40 TABLET, DELAYED RELEASE ORAL at 06:30

## 2018-11-23 RX ADMIN — FOLIC ACID SCH MG: 1 TABLET ORAL at 08:46

## 2018-11-23 RX ADMIN — AMLODIPINE BESYLATE SCH MG: 5 TABLET ORAL at 08:46

## 2018-11-23 RX ADMIN — CHLORDIAZEPOXIDE HYDROCHLORIDE SCH MG: 25 CAPSULE ORAL at 06:00

## 2018-11-23 RX ADMIN — Medication SCH MG: at 08:46

## 2018-11-23 RX ADMIN — HYDROCODONE BITARTRATE AND ACETAMINOPHEN PRN TAB: 10; 325 TABLET ORAL at 06:31

## 2018-11-23 RX ADMIN — CHLORDIAZEPOXIDE HYDROCHLORIDE SCH MG: 25 CAPSULE ORAL at 14:43

## 2018-11-23 RX ADMIN — AMLODIPINE BESYLATE SCH MG: 5 TABLET ORAL at 21:38

## 2018-11-23 RX ADMIN — DEXAMETHASONE SODIUM PHOSPHATE SCH MG: 4 INJECTION, SOLUTION INTRAMUSCULAR; INTRAVENOUS at 17:28

## 2018-11-23 RX ADMIN — HYDROCODONE BITARTRATE AND ACETAMINOPHEN PRN TAB: 10; 325 TABLET ORAL at 11:48

## 2018-11-23 RX ADMIN — Medication SCH UDTAB: at 08:46

## 2018-11-23 RX ADMIN — HYDROCODONE BITARTRATE AND ACETAMINOPHEN PRN TAB: 10; 325 TABLET ORAL at 21:39

## 2018-11-24 VITALS — DIASTOLIC BLOOD PRESSURE: 87 MMHG | SYSTOLIC BLOOD PRESSURE: 144 MMHG

## 2018-11-24 VITALS — DIASTOLIC BLOOD PRESSURE: 73 MMHG | SYSTOLIC BLOOD PRESSURE: 151 MMHG

## 2018-11-24 VITALS — SYSTOLIC BLOOD PRESSURE: 134 MMHG | DIASTOLIC BLOOD PRESSURE: 76 MMHG

## 2018-11-24 VITALS — DIASTOLIC BLOOD PRESSURE: 86 MMHG | SYSTOLIC BLOOD PRESSURE: 118 MMHG

## 2018-11-24 VITALS — DIASTOLIC BLOOD PRESSURE: 83 MMHG | SYSTOLIC BLOOD PRESSURE: 136 MMHG

## 2018-11-24 VITALS — DIASTOLIC BLOOD PRESSURE: 95 MMHG | SYSTOLIC BLOOD PRESSURE: 147 MMHG

## 2018-11-24 RX ADMIN — AMLODIPINE BESYLATE SCH MG: 5 TABLET ORAL at 08:30

## 2018-11-24 RX ADMIN — DEXAMETHASONE SODIUM PHOSPHATE SCH MG: 4 INJECTION, SOLUTION INTRAMUSCULAR; INTRAVENOUS at 00:12

## 2018-11-24 RX ADMIN — Medication SCH UDTAB: at 08:30

## 2018-11-24 RX ADMIN — DEXAMETHASONE SODIUM PHOSPHATE SCH MG: 4 INJECTION, SOLUTION INTRAMUSCULAR; INTRAVENOUS at 17:15

## 2018-11-24 RX ADMIN — DEXAMETHASONE SODIUM PHOSPHATE SCH MG: 4 INJECTION, SOLUTION INTRAMUSCULAR; INTRAVENOUS at 06:25

## 2018-11-24 RX ADMIN — Medication PRN MG: at 11:26

## 2018-11-24 RX ADMIN — Medication SCH MG: at 08:30

## 2018-11-24 RX ADMIN — CHLORDIAZEPOXIDE HYDROCHLORIDE SCH MG: 25 CAPSULE ORAL at 00:34

## 2018-11-24 RX ADMIN — AMLODIPINE BESYLATE SCH MG: 5 TABLET ORAL at 20:33

## 2018-11-24 RX ADMIN — CHLORDIAZEPOXIDE HYDROCHLORIDE SCH MG: 25 CAPSULE ORAL at 22:11

## 2018-11-24 RX ADMIN — CHLORDIAZEPOXIDE HYDROCHLORIDE SCH MG: 25 CAPSULE ORAL at 13:12

## 2018-11-24 RX ADMIN — FOLIC ACID SCH MG: 1 TABLET ORAL at 08:30

## 2018-11-24 RX ADMIN — CHLORDIAZEPOXIDE HYDROCHLORIDE SCH MG: 25 CAPSULE ORAL at 06:33

## 2018-11-24 RX ADMIN — HYDROCODONE BITARTRATE AND ACETAMINOPHEN PRN TAB: 10; 325 TABLET ORAL at 11:28

## 2018-11-24 RX ADMIN — DEXAMETHASONE SODIUM PHOSPHATE SCH MG: 4 INJECTION, SOLUTION INTRAMUSCULAR; INTRAVENOUS at 11:26

## 2018-11-24 RX ADMIN — PANTOPRAZOLE SODIUM SCH MG: 40 TABLET, DELAYED RELEASE ORAL at 06:33

## 2018-11-24 RX ADMIN — HYDROCODONE BITARTRATE AND ACETAMINOPHEN PRN TAB: 10; 325 TABLET ORAL at 22:16

## 2018-11-25 VITALS — DIASTOLIC BLOOD PRESSURE: 82 MMHG | SYSTOLIC BLOOD PRESSURE: 147 MMHG

## 2018-11-25 VITALS — SYSTOLIC BLOOD PRESSURE: 109 MMHG | DIASTOLIC BLOOD PRESSURE: 71 MMHG

## 2018-11-25 VITALS — SYSTOLIC BLOOD PRESSURE: 131 MMHG | DIASTOLIC BLOOD PRESSURE: 86 MMHG

## 2018-11-25 RX ADMIN — HYDROCODONE BITARTRATE AND ACETAMINOPHEN PRN TAB: 10; 325 TABLET ORAL at 06:57

## 2018-11-25 RX ADMIN — CHLORDIAZEPOXIDE HYDROCHLORIDE SCH MG: 25 CAPSULE ORAL at 06:41

## 2018-11-25 RX ADMIN — DEXAMETHASONE SODIUM PHOSPHATE SCH MG: 4 INJECTION, SOLUTION INTRAMUSCULAR; INTRAVENOUS at 00:09

## 2018-11-25 RX ADMIN — AMLODIPINE BESYLATE SCH MG: 5 TABLET ORAL at 09:07

## 2018-11-25 RX ADMIN — FOLIC ACID SCH MG: 1 TABLET ORAL at 09:07

## 2018-11-25 RX ADMIN — Medication SCH UDTAB: at 09:07

## 2018-11-25 RX ADMIN — PANTOPRAZOLE SODIUM SCH MG: 40 TABLET, DELAYED RELEASE ORAL at 06:41

## 2018-11-25 RX ADMIN — DEXAMETHASONE SODIUM PHOSPHATE SCH MG: 4 INJECTION, SOLUTION INTRAMUSCULAR; INTRAVENOUS at 06:41

## 2018-11-25 RX ADMIN — Medication SCH MG: at 09:07
